# Patient Record
Sex: MALE | Race: OTHER
[De-identification: names, ages, dates, MRNs, and addresses within clinical notes are randomized per-mention and may not be internally consistent; named-entity substitution may affect disease eponyms.]

---

## 2023-01-28 ENCOUNTER — HOSPITAL ENCOUNTER (INPATIENT)
Dept: HOSPITAL 54 - GPS | Age: 68
LOS: 5 days | Discharge: HOME | DRG: 885 | End: 2023-02-02
Attending: INTERNAL MEDICINE | Admitting: PSYCHIATRY & NEUROLOGY
Payer: MEDICARE

## 2023-01-28 VITALS — DIASTOLIC BLOOD PRESSURE: 82 MMHG | SYSTOLIC BLOOD PRESSURE: 127 MMHG

## 2023-01-28 VITALS — DIASTOLIC BLOOD PRESSURE: 82 MMHG | SYSTOLIC BLOOD PRESSURE: 142 MMHG

## 2023-01-28 VITALS — BODY MASS INDEX: 20.4 KG/M2 | HEIGHT: 67 IN | WEIGHT: 130 LBS

## 2023-01-28 VITALS — DIASTOLIC BLOOD PRESSURE: 80 MMHG | SYSTOLIC BLOOD PRESSURE: 132 MMHG

## 2023-01-28 DIAGNOSIS — Z91.81: ICD-10-CM

## 2023-01-28 DIAGNOSIS — Z73.6: ICD-10-CM

## 2023-01-28 DIAGNOSIS — I10: ICD-10-CM
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
## 2023-01-28 DIAGNOSIS — R27.8: ICD-10-CM

## 2023-01-28 DIAGNOSIS — F13.10: ICD-10-CM

## 2023-01-28 DIAGNOSIS — G31.84: ICD-10-CM

## 2023-01-28 DIAGNOSIS — F10.10: ICD-10-CM

## 2023-01-28 DIAGNOSIS — R53.1: ICD-10-CM

## 2023-01-28 DIAGNOSIS — F41.0: ICD-10-CM

## 2023-01-28 DIAGNOSIS — F33.2: Primary | ICD-10-CM

## 2023-01-28 NOTE — NUR
RN NOTES: PATIENT RESTING IN HIS ROOM.  NO APPARENT DISTRESS NOTED, PATIENT EASILY AGITATED, 
 HALLUCINATING , PARANOID , ALL NEEDS ATTENDED AND ANTICIPATED, NEEDS FREQUENTLY 
REDIRECTIONS ,DENIES SI/HI/AVH AT THIS TIME. SAFETY PRECAUTIONS MAINTAINED. WILL CONTINUE TO 
MONITOR Q15MIN ROUNDS FOR SAFETY AND BEHAVIOR.

## 2023-01-28 NOTE — NUR
RN NOTES: ANXIETY 

PT.C/O FEELING ANXIOUS,RESTLESS ,PARANOID , PRN ATIVAN 0.5 MG PO GIVEN , WILL CONTINUE TO 
MONITOR.

## 2023-01-28 NOTE — NUR
RECEIVED PT FROM [place] ON 5150 ORIGINALLY FROM HOME BROUGHT TO ER BY FAMILY MEMBER FOR 
HALLUCINATION. AMBULATORY NO ACUTE DISTRESS NOTED. ADMITTING ORDERS FROM DR EDUARDO RECEIVED 
AND CARRIED OUT. NO VALUABLES OR CONTRABAND FOUND. ON FACE TO FACE ASSESSMENT NO SI /HI  
REPORTED. PT REPORTS AUDIO HALLUCINATIONS FOLLOWING ALCOHOL INTAKE ( ON DAILY BASIC/ HALF 
BOTTLE OF DARRIAN) LAST ALCOHOL INTAKE REPORTED 2 WEEKS AGO. PT IS AMBULATORY WITH NO SKIN 
ISSUES.

## 2023-01-28 NOTE — NUR
RN NOTES: NOTIFIED ON CALL NP JAMES FRANCIS , REGARDING MED RECON , PER HIS STATES  OK , 
WILL CONTINUITY WITH CARE.

## 2023-01-29 VITALS — SYSTOLIC BLOOD PRESSURE: 157 MMHG | DIASTOLIC BLOOD PRESSURE: 88 MMHG

## 2023-01-29 VITALS — DIASTOLIC BLOOD PRESSURE: 99 MMHG | SYSTOLIC BLOOD PRESSURE: 166 MMHG

## 2023-01-29 VITALS — DIASTOLIC BLOOD PRESSURE: 86 MMHG | SYSTOLIC BLOOD PRESSURE: 150 MMHG

## 2023-01-29 LAB
ALBUMIN SERPL BCP-MCNC: 3.2 G/DL (ref 3.4–5)
ALP SERPL-CCNC: 74 U/L (ref 46–116)
ALT SERPL W P-5'-P-CCNC: 8 U/L (ref 12–78)
AST SERPL W P-5'-P-CCNC: 18 U/L (ref 15–37)
BILIRUB SERPL-MCNC: 0.4 MG/DL (ref 0.2–1)
BUN SERPL-MCNC: 13 MG/DL (ref 7–18)
CALCIUM SERPL-MCNC: 8.7 MG/DL (ref 8.5–10.1)
CHLORIDE SERPL-SCNC: 105 MMOL/L (ref 98–107)
CHOLEST SERPL-MCNC: 136 MG/DL (ref ?–200)
CO2 SERPL-SCNC: 27 MMOL/L (ref 21–32)
CREAT SERPL-MCNC: 0.9 MG/DL (ref 0.6–1.3)
GLUCOSE SERPL-MCNC: 102 MG/DL (ref 74–106)
HDLC SERPL-MCNC: 33 MG/DL (ref 40–60)
LDLC SERPL DIRECT ASSAY-MCNC: 94 MG/DL (ref 0–99)
POTASSIUM SERPL-SCNC: 3.3 MMOL/L (ref 3.5–5.1)
PROT SERPL-MCNC: 6.6 G/DL (ref 6.4–8.2)
SODIUM SERPL-SCNC: 141 MMOL/L (ref 136–145)
TRIGL SERPL-MCNC: 54 MG/DL (ref 30–150)

## 2023-01-29 RX ADMIN — TEMAZEPAM PRN MG: 7.5 CAPSULE ORAL at 20:41

## 2023-01-29 RX ADMIN — CARVEDILOL SCH MG: 12.5 TABLET, FILM COATED ORAL at 17:16

## 2023-01-29 RX ADMIN — MIRTAZAPINE SCH MG: 15 TABLET, FILM COATED ORAL at 21:23

## 2023-01-30 VITALS — DIASTOLIC BLOOD PRESSURE: 90 MMHG | SYSTOLIC BLOOD PRESSURE: 150 MMHG

## 2023-01-30 VITALS — SYSTOLIC BLOOD PRESSURE: 140 MMHG | DIASTOLIC BLOOD PRESSURE: 88 MMHG

## 2023-01-30 VITALS — SYSTOLIC BLOOD PRESSURE: 145 MMHG | DIASTOLIC BLOOD PRESSURE: 80 MMHG

## 2023-01-30 VITALS — SYSTOLIC BLOOD PRESSURE: 159 MMHG | DIASTOLIC BLOOD PRESSURE: 78 MMHG

## 2023-01-30 RX ADMIN — TEMAZEPAM PRN MG: 7.5 CAPSULE ORAL at 20:21

## 2023-01-30 RX ADMIN — CARVEDILOL SCH MG: 12.5 TABLET, FILM COATED ORAL at 08:53

## 2023-01-30 RX ADMIN — MIRTAZAPINE SCH MG: 15 TABLET, FILM COATED ORAL at 20:20

## 2023-01-30 RX ADMIN — CARVEDILOL SCH MG: 12.5 TABLET, FILM COATED ORAL at 16:13

## 2023-01-30 NOTE — NUR
JF Initial Discharge Note:

Patient currently resides at home located at [address] [place] [address]; ([phone]). Patient would want to return back home upon discharge. JF will 
contact pt's daughter Radha ([phone]) to discuss treatment/discharge plan. JF will work 
with the MD, family, and treatment team to help coordinate appropriate discharge.

## 2023-01-30 NOTE — NUR
JF Family Contact:

JF spoke with patient's daughter Megan ([phone]) and discussed treatment/discharge plan. 
Megan was concerned about pt taking ativan because she stated it makes him "crazy". She 
reported to let the doctor know. She stated clondine medication has been helping him. She 
would want pt back home upon discharge. JF shared this concern of medications to Dr. Wagner.

## 2023-01-30 NOTE — NUR
JF Clinical Note:

Pt placed on a 5150 hold for GD. Pt was brought to the hospital due to hallucinations. 
Patient currently resides at home located at [address] [place] [address]; 
([phone]). Patient would want to return back home upon discharge. JF will contact pt's 
daughter Radha ([phone]) to discuss treatment/discharge plan.

## 2023-01-30 NOTE — NUR
Social Work Note/Substance Abuse Intervention:



Patient was provided with a brief substance abuse intervention and referred to [facility] ([phone]), [facility] ([phone]), and [facility] ([phone]) for 
drinking alcohol excessively.

## 2023-01-31 VITALS — DIASTOLIC BLOOD PRESSURE: 61 MMHG | SYSTOLIC BLOOD PRESSURE: 115 MMHG

## 2023-01-31 VITALS — SYSTOLIC BLOOD PRESSURE: 136 MMHG | DIASTOLIC BLOOD PRESSURE: 71 MMHG

## 2023-01-31 VITALS — DIASTOLIC BLOOD PRESSURE: 81 MMHG | SYSTOLIC BLOOD PRESSURE: 145 MMHG

## 2023-01-31 VITALS — DIASTOLIC BLOOD PRESSURE: 82 MMHG | SYSTOLIC BLOOD PRESSURE: 145 MMHG

## 2023-01-31 RX ADMIN — CARVEDILOL SCH MG: 12.5 TABLET, FILM COATED ORAL at 08:27

## 2023-01-31 RX ADMIN — CARVEDILOL SCH MG: 12.5 TABLET, FILM COATED ORAL at 16:26

## 2023-01-31 RX ADMIN — TEMAZEPAM PRN MG: 7.5 CAPSULE ORAL at 21:17

## 2023-01-31 RX ADMIN — MIRTAZAPINE SCH MG: 15 TABLET, FILM COATED ORAL at 21:17

## 2023-01-31 NOTE — NUR
noc rn opening



received patient in bed, a/ox3. no s/s of apparent distress in room air. no c/o at this 
time. will do Q15 visual checks and rounding with staff to ensure patient safety.

## 2023-01-31 NOTE — NUR
noc rn note



patient request for sleeping medication. Given restoril 7.5mg as ordered PRN. will 
re-assess.

## 2023-01-31 NOTE — NUR
JF Coordination of Care:

Patient will follow up with (Internist) Dr. Lisa located at [address] [facility] [place] [facility] [place] [place] [address]; ([phone]) at  January 13th between 3PM who will monitor and provide 
psychotropic medications.

-------------------------------------------------------------------------------

Addendum: 02/02/23 at 0908 by JF ORELLANA

-------------------------------------------------------------------------------

Correction February 13

## 2023-02-01 VITALS — DIASTOLIC BLOOD PRESSURE: 74 MMHG | SYSTOLIC BLOOD PRESSURE: 134 MMHG

## 2023-02-01 VITALS — SYSTOLIC BLOOD PRESSURE: 139 MMHG | DIASTOLIC BLOOD PRESSURE: 80 MMHG

## 2023-02-01 VITALS — DIASTOLIC BLOOD PRESSURE: 70 MMHG | SYSTOLIC BLOOD PRESSURE: 126 MMHG

## 2023-02-01 RX ADMIN — TEMAZEPAM PRN MG: 7.5 CAPSULE ORAL at 21:49

## 2023-02-01 RX ADMIN — MIRTAZAPINE SCH MG: 15 TABLET, FILM COATED ORAL at 21:14

## 2023-02-01 RX ADMIN — CARVEDILOL SCH MG: 12.5 TABLET, FILM COATED ORAL at 08:19

## 2023-02-01 RX ADMIN — CARVEDILOL SCH MG: 12.5 TABLET, FILM COATED ORAL at 16:29

## 2023-02-01 NOTE — NUR
GPS RN NOTE: INSOMNIA



PATIENT VERBALIZED SLEEPLESSNESS AND INSISTED TO TAKE SLEEPING MEDICINE AT THIS TIME. PRN 
RESTORIL 7.5 MG PO ADMINISTERED AS ORDERED BY MD. WILL CONTINUE TO MONITOR FOR ANY CHANGE OF 
CONDITION.

## 2023-02-01 NOTE — NUR
RN NOTE



PATIENT RESTING IN BED. A/O X3, VERBALLY RESPONSIVE AND ABLE TO MAKE NEEDS KNOWN. NO SIGNS 
OF ACUTE DISTRESS NOTED. STABLE ON ROOM AIR. BREATHING EVEN AND UNLABORED. COMPLIANT WITH 
MEDICATIONS. PATIENT CALM AND COOPERATIVE WITH CARE. STAYED IN HIS ROOM THROUGHOUT THE 
SHIFT. NO EPISODES OF UNTOWARD BEHAVIOR NOTED. SAFETY MEASURE MAINTAINED. WILL ENDORSE TO 
NEXT SHIFT FOR CONTINUITY OF CARE.

## 2023-02-01 NOTE — NUR
NOC RN OPENING NOTE



RECEIVED PATIENT IN BED, AWAKE, ALERT AND ORIENTED X 3. ABLE TO MAKE NEEDS KNOWN. AFEBRILE 
AND NOT IN ANY FORM OF ACUTE DISTRESS. BREATHING EVEN AND NON LABORED. NO C/O PAIN OR 
DISCOMFORT. NO BEHAVIOR ISSUES NOTED AT THIS TIME. SAFETY MEASURES IN PLACE. KEPT BED IN 
LOCKED AND IN LOW POSITION. SIDE RAILS UP X2.

## 2023-02-02 VITALS — DIASTOLIC BLOOD PRESSURE: 85 MMHG | SYSTOLIC BLOOD PRESSURE: 145 MMHG

## 2023-02-02 RX ADMIN — CARVEDILOL SCH MG: 12.5 TABLET, FILM COATED ORAL at 08:39

## 2023-02-02 NOTE — NUR
Dr. Wagner gave an order to D/C hold and D/C home and to follow up with the internist who 
will monitor and provide psychotropic medications. Daughter will come and  pt.

## 2023-02-02 NOTE — NUR
RN- DISCHARGE NOTES

PATIENT HAD A DISCHARGE FROM DR. EDUARDO ( PSYCHIATRIST) DR. MARTÍNEZ ( INTERNIST) 
MEDICALLY CLEARED PATIENT FOR DISCHARGE.PATIENT DID NOT VERBALIZE SI/HI,DENIES 
VISUAL/AUDITORY HALLUCINATIONS AT THE TIME OF DISCHARGE. PATIENT SIGN ALL DISCHARGE PAPERS 
WITH UNDERSTANDING.INSTRUCTED PATIENT TO GO TO THE NEAREST EMERGENCY ROOM OR CALL 911 IN 
CASE OF EMERGENCY, ALSO INSTRUCTED  TO FOLLOW UP WITH PCP ON FEB. 13 OR AS NEEDED. PATIENT 
WAS  BY DAUGHTER TOM ([phone]) VIA PRIVATE CAR.ALL BELONGINGS WAS GIVEN TO THE 
DAUGHTER INCLUDING RX.PATIENT LEFT THE UNIT IN STABLE CONDITION A/O X3  AMBULATORY STEADY 
GAIT.